# Patient Record
Sex: MALE | Race: WHITE | Employment: FULL TIME | URBAN - NONMETROPOLITAN AREA
[De-identification: names, ages, dates, MRNs, and addresses within clinical notes are randomized per-mention and may not be internally consistent; named-entity substitution may affect disease eponyms.]

---

## 2021-09-03 ENCOUNTER — HOSPITAL ENCOUNTER (OUTPATIENT)
Dept: ULTRASOUND IMAGING | Age: 41
Discharge: HOME | End: 2021-09-03
Payer: OTHER GOVERNMENT

## 2021-09-03 DIAGNOSIS — R79.89 ABNORMAL LFTS (LIVER FUNCTION TESTS): ICD-10-CM

## 2021-09-03 PROCEDURE — 76705 ECHO EXAM OF ABDOMEN: CPT

## 2021-09-22 ENCOUNTER — HOSPITAL ENCOUNTER (OUTPATIENT)
Age: 41
Discharge: HOME | End: 2021-09-22
Payer: OTHER GOVERNMENT

## 2021-09-22 ENCOUNTER — HOSPITAL ENCOUNTER (OUTPATIENT)
Dept: GENERAL RADIOLOGY | Age: 41
Discharge: HOME | End: 2021-09-22
Payer: OTHER GOVERNMENT

## 2021-09-22 DIAGNOSIS — R91.8 INFILTRATE OF LOWER LOBE OF RIGHT LUNG PRESENT ON IMAGING STUDY: ICD-10-CM

## 2021-09-22 DIAGNOSIS — R93.2 ABNORMAL ULTRASOUND OF LIVER: ICD-10-CM

## 2021-09-22 PROCEDURE — 71046 X-RAY EXAM CHEST 2 VIEWS: CPT

## 2021-10-08 ENCOUNTER — HOSPITAL ENCOUNTER (OUTPATIENT)
Age: 41
Discharge: HOME | End: 2021-10-08
Payer: OTHER GOVERNMENT

## 2021-10-08 ENCOUNTER — HOSPITAL ENCOUNTER (OUTPATIENT)
Dept: GENERAL RADIOLOGY | Age: 41
Discharge: HOME | End: 2021-10-08
Payer: OTHER GOVERNMENT

## 2021-10-08 DIAGNOSIS — R91.8 RIGHT PULMONARY INFILTRATE ON CXR: ICD-10-CM

## 2021-10-08 DIAGNOSIS — J90 PLEURAL EFFUSION ON RIGHT: ICD-10-CM

## 2021-10-08 DIAGNOSIS — R91.8 LEFT PULMONARY INFILTRATE ON CXR: ICD-10-CM

## 2021-10-08 PROCEDURE — 71046 X-RAY EXAM CHEST 2 VIEWS: CPT

## 2021-12-26 ENCOUNTER — HOSPITAL ENCOUNTER (EMERGENCY)
Age: 41
Discharge: HOME | End: 2021-12-26
Payer: OTHER GOVERNMENT

## 2021-12-26 VITALS
TEMPERATURE: 97.8 F | RESPIRATION RATE: 16 BRPM | WEIGHT: 277 LBS | OXYGEN SATURATION: 95 % | DIASTOLIC BLOOD PRESSURE: 76 MMHG | HEART RATE: 87 BPM | SYSTOLIC BLOOD PRESSURE: 120 MMHG

## 2021-12-26 DIAGNOSIS — Z20.822 EXPOSURE TO COVID-19 VIRUS: Primary | ICD-10-CM

## 2021-12-26 LAB — SARS-COV-2, NAA: NOT  DETECTED

## 2021-12-26 PROCEDURE — 87635 SARS-COV-2 COVID-19 AMP PRB: CPT

## 2021-12-26 PROCEDURE — 99202 OFFICE O/P NEW SF 15 MIN: CPT | Performed by: NURSE PRACTITIONER

## 2021-12-26 PROCEDURE — 99213 OFFICE O/P EST LOW 20 MIN: CPT

## 2021-12-26 RX ORDER — LEVOTHYROXINE SODIUM 0.12 MG/1
100 TABLET ORAL DAILY
COMMUNITY

## 2021-12-26 RX ORDER — ESCITALOPRAM OXALATE 20 MG/1
20 TABLET ORAL DAILY
COMMUNITY

## 2021-12-26 RX ORDER — NORTRIPTYLINE HYDROCHLORIDE 10 MG/1
10 CAPSULE ORAL NIGHTLY
COMMUNITY

## 2021-12-26 ASSESSMENT — PAIN SCALES - GENERAL: PAINLEVEL_OUTOF10: 4

## 2021-12-26 ASSESSMENT — ENCOUNTER SYMPTOMS
ABDOMINAL PAIN: 0
SHORTNESS OF BREATH: 0
COUGH: 1
DIARRHEA: 0
ABDOMINAL DISTENTION: 0
EYE PAIN: 0
WHEEZING: 0
CHEST TIGHTNESS: 0
CONSTIPATION: 0
EYE REDNESS: 0

## 2021-12-26 ASSESSMENT — PAIN DESCRIPTION - LOCATION: LOCATION: HEAD

## 2021-12-26 NOTE — ED NOTES
advised to call back for any additional questions or concerns     Pt discharged in stable condition, ambulated to vehicle home by himself.          Yogesh Beck, EMILIA  23/50/09 3417

## 2021-12-26 NOTE — ED PROVIDER NOTES
[facility] Encounter      [facility]       Chief Complaint   Patient presents with    Fever    Headache     daughter is covid positive    Concern For COVID-19       Nurses Notes reviewed and I agree except as noted in the HPI. HISTORY OF PRESENT ILLNESS   Roberto Solis is a 46 y.o. male who presents The history is provided by the patient. URI  Presenting symptoms: congestion, cough, fever, rhinorrhea and sore throat    Presenting symptoms: no ear pain and no fatigue    Congestion:     Location:  Nasal    Interferes with sleep: yes      Interferes with eating/drinking: yes    Cough:     Cough characteristics:  Croupy and hacking    Sputum characteristics:  Nondescript    Severity:  Mild    Onset quality:  Sudden    Duration:  2 days    Timing:  Intermittent    Progression:  Worsening    Chronicity:  New  Severity:  Mild  Onset quality:  Sudden  Duration:  2 days  Timing:  Intermittent  Progression:  Waxing and waning  Chronicity:  New  Relieved by:  OTC medications and rest  Worsened by:  Drinking, eating and movement  Ineffective treatments:  OTC medications and rest  Associated symptoms: headaches and sinus pain    Associated symptoms: no arthralgias, no myalgias and no wheezing    Risk factors: sick contacts        REVIEW OF SYSTEMS     Review of Systems   Constitutional: Positive for appetite change and fever. Negative for activity change, fatigue and unexpected weight change. HENT: Positive for congestion, rhinorrhea, sinus pressure, sinus pain and sore throat. Negative for ear discharge, ear pain, nosebleeds and tinnitus. Eyes: Negative for pain and redness. Respiratory: Positive for cough. Negative for chest tightness, shortness of breath and wheezing. Cardiovascular: Negative for chest pain. Gastrointestinal: Negative for abdominal distention, abdominal pain, constipation and diarrhea.    Endocrine: Negative for cold intolerance and heat intolerance. Genitourinary: Negative for difficulty urinating and dysuria. Musculoskeletal: Negative for arthralgias and myalgias. Skin: Negative for pallor and rash. Allergic/Immunologic: Negative for environmental allergies, food allergies and immunocompromised state. Neurological: Positive for headaches. Negative for dizziness, weakness, light-headedness and numbness. Hematological: Negative for adenopathy. Does not bruise/bleed easily. Psychiatric/Behavioral: Negative for behavioral problems and decreased concentration. The patient is not nervous/anxious. All other systems reviewed and are negative. PAST MEDICAL HISTORY   No past medical history on file. SURGICAL HISTORY     Patient  has no past surgical history on file. CURRENT MEDICATIONS       Discharge Medication List as of 12/26/2021  3:59 PM      CONTINUE these medications which have NOT CHANGED    Details   QUETIAPINE FUMARATE PO Take 25 mg by mouth Historical Med      nortriptyline (PAMELOR) 10 MG capsule Take 10 mg by mouth nightlyHistorical Med      escitalopram (LEXAPRO) 20 MG tablet Take 20 mg by mouth dailyHistorical Med      levothyroxine (SYNTHROID) 125 MCG tablet Take 100 mcg by mouth Daily Historical Med             ALLERGIES     Patient is has No Known Allergies. FAMILY HISTORY     Patient'sfamily history is not on file. SOCIAL HISTORY     Patient      PHYSICAL EXAM     ED TRIAGE VITALS  BP: 120/76, Temp: 97.8 °F (36.6 °C), Pulse: 87, Resp: 16, SpO2: 95 %  Physical Exam  Vitals and nursing note reviewed. Constitutional:       Appearance: Normal appearance. He is well-developed and normal weight. HENT:      Head: Normocephalic and atraumatic. Right Ear: External ear normal.      Left Ear: External ear normal.      Nose: Congestion and rhinorrhea ( yellow) present. Mouth/Throat:      Mouth: Mucous membranes are dry. Pharynx: Posterior oropharyngeal erythema present. No oropharyngeal exudate. Eyes:      General:         Right eye: No discharge. Left eye: No discharge. Conjunctiva/sclera: Conjunctivae normal.   Neck:      Thyroid: No thyromegaly. Cardiovascular:      Rate and Rhythm: Normal rate and regular rhythm. Pulses: Normal pulses. Heart sounds: Normal heart sounds. Pulmonary:      Effort: Pulmonary effort is normal. No respiratory distress. Breath sounds: Normal breath sounds. No wheezing or rales. Chest:      Chest wall: No tenderness. Abdominal:      General: Bowel sounds are normal. There is no distension. Palpations: Abdomen is soft. Tenderness: There is no abdominal tenderness. Musculoskeletal:         General: No tenderness. Normal range of motion. Cervical back: Normal range of motion and neck supple. No muscular tenderness. Lymphadenopathy:      Cervical: No cervical adenopathy. Skin:     General: Skin is warm and dry. Capillary Refill: Capillary refill takes less than 2 seconds. Coloration: Skin is pale. Findings: No erythema or rash. Neurological:      General: No focal deficit present. Mental Status: He is alert and oriented to person, place, and time. Mental status is at baseline. Cranial Nerves: No cranial nerve deficit. Motor: No abnormal muscle tone. Coordination: Coordination normal.      Deep Tendon Reflexes: Reflexes are normal and symmetric. Reflexes normal.   Psychiatric:         Behavior: Behavior normal.         Thought Content:  Thought content normal.         Judgment: Judgment normal.         DIAGNOSTIC RESULTS   Labs:   Results for orders placed or performed during the hospital encounter of 12/26/21   COVID-19, Rapid   Result Value Ref Range    SARS-CoV-2, HOLLY NOT  DETECTED NOT DETECTED       IMAGING:  No orders to display     URGENT CARE COURSE:     Vitals:    12/26/21 1524   BP: 120/76   Pulse: 87   Resp: 16   Temp: 97.8 °F (36.6 °C)   SpO2: 95%   Weight: 277 lb (125.6 kg) Medications - No data to display  PROCEDURES:  None  FINALIMPRESSION    I have reviewed the patient's medical history in detail and updated the computerized patient record. HPI/ROS per the patient and caregiver. Overall non toxic in appearance. Answers questions appropriately. Conditions discussed and addressed this visit include:     Patient appears ill but non-toxic and well hydrated. Covid testing completed per his request. VSS. No acute clinical concern for covid at this time. Discussed all findings with the patient. Patient is to take medications as directed. Continue all home medications. Potential side effects of the medications were reviewed with the patient in detail. The patient can increase fluids. The patient can have Tylenol or Motrin for pain and fever as needed. Discussed with patient signs and symptoms that would require emergent evaluation and treatment. The patient will follow-up with their family physician or go to the ER if they develop any worsening symptoms. The patient was discharged in stable condition      1.  Exposure to COVID-19 virus        DISPOSITION/PLAN   DISPOSITION      PATIENT REFERRED TO:  MD Luis Lynn [phone]. [phone]    In 3 days  As needed, If symptoms worsen    DISCHARGE MEDICATIONS:  Discharge Medication List as of 12/26/2021  3:59 PM        Discharge Medication List as of 12/26/2021  3:59 PM          ISABEL Ortega - [address], APRN - CNP  12/29/21 5582

## 2021-12-29 ASSESSMENT — ENCOUNTER SYMPTOMS
SINUS PRESSURE: 1
SINUS PAIN: 1
SORE THROAT: 1
RHINORRHEA: 1

## 2022-01-07 ENCOUNTER — OFFICE VISIT (OUTPATIENT)
Dept: PULMONOLOGY | Age: 42
End: 2022-01-07
Payer: OTHER GOVERNMENT

## 2022-01-07 VITALS
WEIGHT: 284.8 LBS | SYSTOLIC BLOOD PRESSURE: 118 MMHG | DIASTOLIC BLOOD PRESSURE: 78 MMHG | HEART RATE: 66 BPM | OXYGEN SATURATION: 99 % | BODY MASS INDEX: 43.16 KG/M2 | HEIGHT: 68 IN | TEMPERATURE: 97.1 F

## 2022-01-07 DIAGNOSIS — Z78.9 NEVER SMOKED CIGARETTES: ICD-10-CM

## 2022-01-07 DIAGNOSIS — Z99.89 OSA ON CPAP: ICD-10-CM

## 2022-01-07 DIAGNOSIS — J90 PLEURAL EFFUSION ON RIGHT: Primary | ICD-10-CM

## 2022-01-07 DIAGNOSIS — G47.33 OSA ON CPAP: ICD-10-CM

## 2022-01-07 PROCEDURE — 99204 OFFICE O/P NEW MOD 45 MIN: CPT | Performed by: INTERNAL MEDICINE

## 2022-01-07 ASSESSMENT — ENCOUNTER SYMPTOMS
STRIDOR: 0
COUGH: 0
APNEA: 0
CHEST TIGHTNESS: 0
SHORTNESS OF BREATH: 0
WHEEZING: 0
ALLERGIC/IMMUNOLOGIC NEGATIVE: 1
CHOKING: 0
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
## 2022-01-11 ENCOUNTER — HOSPITAL ENCOUNTER (OUTPATIENT)
Dept: ULTRASOUND IMAGING | Age: 42
Discharge: HOME | End: 2022-01-11
Payer: OTHER GOVERNMENT

## 2022-01-11 DIAGNOSIS — J90 PLEURAL EFFUSION ON RIGHT: ICD-10-CM

## 2022-01-11 PROCEDURE — 76604 US EXAM CHEST: CPT

## 2022-01-13 ENCOUNTER — TELEPHONE (OUTPATIENT)
Dept: PULMONOLOGY | Age: 42
End: 2022-01-13

## 2022-01-13 NOTE — TELEPHONE ENCOUNTER
----- Message from Gerardo Pickard MD sent at 1/12/2022 11:03 PM EST -----  No pleural effusion, no need for thoracentesis  Please notify patient

## 2022-01-25 ENCOUNTER — HOSPITAL ENCOUNTER (OUTPATIENT)
Dept: NON INVASIVE DIAGNOSTICS | Age: 42
Discharge: HOME | End: 2022-01-25
Payer: OTHER GOVERNMENT

## 2022-01-25 DIAGNOSIS — J90 PLEURAL EFFUSION, BILATERAL: ICD-10-CM

## 2022-01-25 LAB
LV EF: 60 %
LVEF MODALITY: NORMAL

## 2022-01-25 PROCEDURE — 93306 TTE W/DOPPLER COMPLETE: CPT

## 2022-02-08 ENCOUNTER — HOSPITAL ENCOUNTER (OUTPATIENT)
Dept: CT IMAGING | Age: 42
Discharge: HOME | End: 2022-02-08
Payer: OTHER GOVERNMENT

## 2022-02-08 DIAGNOSIS — J90 PLEURAL EFFUSION ON RIGHT: ICD-10-CM

## 2022-02-08 PROCEDURE — 71250 CT THORAX DX C-: CPT

## 2022-06-27 ENCOUNTER — APPOINTMENT (OUTPATIENT)
Dept: GENERAL RADIOLOGY | Age: 42
End: 2022-06-27
Payer: OTHER GOVERNMENT

## 2022-06-27 ENCOUNTER — HOSPITAL ENCOUNTER (EMERGENCY)
Age: 42
Discharge: HOME | End: 2022-06-27
Payer: OTHER GOVERNMENT

## 2022-06-27 VITALS
SYSTOLIC BLOOD PRESSURE: 122 MMHG | WEIGHT: 280 LBS | OXYGEN SATURATION: 95 % | TEMPERATURE: 98.2 F | DIASTOLIC BLOOD PRESSURE: 78 MMHG | HEART RATE: 59 BPM | BODY MASS INDEX: 42.57 KG/M2 | RESPIRATION RATE: 13 BRPM

## 2022-06-27 DIAGNOSIS — R07.89 PRESSURE IN LEFT SIDE OF CHEST: Primary | ICD-10-CM

## 2022-06-27 LAB
ANION GAP SERPL CALCULATED.3IONS-SCNC: 13 MEQ/L (ref 8–16)
BASOPHILS # BLD: 1.4 %
BASOPHILS ABSOLUTE: 0.1 THOU/MM3 (ref 0–0.1)
BUN BLDV-MCNC: 19 MG/DL (ref 7–22)
CALCIUM SERPL-MCNC: 9.6 MG/DL (ref 8.5–10.5)
CHLORIDE BLD-SCNC: 100 MEQ/L (ref 98–111)
CO2: 26 MEQ/L (ref 23–33)
CREAT SERPL-MCNC: 1 MG/DL (ref 0.4–1.2)
EKG ATRIAL RATE: 59 BPM
EKG P AXIS: 31 DEGREES
EKG P-R INTERVAL: 154 MS
EKG Q-T INTERVAL: 418 MS
EKG QRS DURATION: 122 MS
EKG QTC CALCULATION (BAZETT): 413 MS
EKG R AXIS: -7 DEGREES
EKG T AXIS: 30 DEGREES
EKG VENTRICULAR RATE: 59 BPM
EOSINOPHIL # BLD: 4.5 %
EOSINOPHILS ABSOLUTE: 0.3 THOU/MM3 (ref 0–0.4)
ERYTHROCYTE [DISTWIDTH] IN BLOOD BY AUTOMATED COUNT: 14.6 % (ref 11.5–14.5)
ERYTHROCYTE [DISTWIDTH] IN BLOOD BY AUTOMATED COUNT: 45.1 FL (ref 35–45)
GFR SERPL CREATININE-BSD FRML MDRD: 83 ML/MIN/1.73M2
GLUCOSE BLD-MCNC: 87 MG/DL (ref 70–108)
HCT VFR BLD CALC: 44.3 % (ref 42–52)
HEMOGLOBIN: 14.2 GM/DL (ref 14–18)
IMMATURE GRANS (ABS): 0.01 THOU/MM3 (ref 0–0.07)
IMMATURE GRANULOCYTES: 0.2 %
LYMPHOCYTES # BLD: 31.7 %
LYMPHOCYTES ABSOLUTE: 2 THOU/MM3 (ref 1–4.8)
MCH RBC QN AUTO: 27.4 PG (ref 26–33)
MCHC RBC AUTO-ENTMCNC: 32.1 GM/DL (ref 32.2–35.5)
MCV RBC AUTO: 85.5 FL (ref 80–94)
MONOCYTES # BLD: 6.3 %
MONOCYTES ABSOLUTE: 0.4 THOU/MM3 (ref 0.4–1.3)
NUCLEATED RED BLOOD CELLS: 0 /100 WBC
OSMOLALITY CALCULATION: 279.2 MOSMOL/KG (ref 275–300)
PLATELET # BLD: 323 THOU/MM3 (ref 130–400)
PMV BLD AUTO: 8.8 FL (ref 9.4–12.4)
POTASSIUM SERPL-SCNC: 4.4 MEQ/L (ref 3.5–5.2)
RBC # BLD: 5.18 MILL/MM3 (ref 4.7–6.1)
SEG NEUTROPHILS: 55.9 %
SEGMENTED NEUTROPHILS ABSOLUTE COUNT: 3.6 THOU/MM3 (ref 1.8–7.7)
SODIUM BLD-SCNC: 139 MEQ/L (ref 135–145)
TROPONIN T: < 0.01 NG/ML
TROPONIN T: < 0.01 NG/ML
WBC # BLD: 6.4 THOU/MM3 (ref 4.8–10.8)

## 2022-06-27 PROCEDURE — 99215 OFFICE O/P EST HI 40 MIN: CPT

## 2022-06-27 PROCEDURE — 84484 ASSAY OF TROPONIN QUANT: CPT

## 2022-06-27 PROCEDURE — 71046 X-RAY EXAM CHEST 2 VIEWS: CPT

## 2022-06-27 PROCEDURE — 85025 COMPLETE CBC W/AUTO DIFF WBC: CPT

## 2022-06-27 PROCEDURE — 99284 EMERGENCY DEPT VISIT MOD MDM: CPT

## 2022-06-27 PROCEDURE — 36415 COLL VENOUS BLD VENIPUNCTURE: CPT

## 2022-06-27 PROCEDURE — 6370000000 HC RX 637 (ALT 250 FOR IP): Performed by: NURSE PRACTITIONER

## 2022-06-27 PROCEDURE — 93005 ELECTROCARDIOGRAM TRACING: CPT | Performed by: NURSE PRACTITIONER

## 2022-06-27 PROCEDURE — 93010 ELECTROCARDIOGRAM REPORT: CPT | Performed by: INTERNAL MEDICINE

## 2022-06-27 PROCEDURE — 80048 BASIC METABOLIC PNL TOTAL CA: CPT

## 2022-06-27 RX ORDER — OMEPRAZOLE 10 MG/1
10 CAPSULE, DELAYED RELEASE ORAL DAILY
COMMUNITY

## 2022-06-27 RX ORDER — ASPIRIN 81 MG/1
324 TABLET, CHEWABLE ORAL ONCE
Status: COMPLETED | OUTPATIENT
Start: 2022-06-27 | End: 2022-06-27

## 2022-06-27 RX ORDER — ASPIRIN 81 MG/1
TABLET, CHEWABLE ORAL
Status: DISCONTINUED
Start: 2022-06-27 | End: 2022-06-27

## 2022-06-27 RX ADMIN — ASPIRIN 324 MG: 81 TABLET, CHEWABLE ORAL at 18:36

## 2022-06-27 ASSESSMENT — ENCOUNTER SYMPTOMS
WHEEZING: 0
SINUS PRESSURE: 0
EYE PAIN: 0
COLOR CHANGE: 0
BLOOD IN STOOL: 0
DIARRHEA: 0
SHORTNESS OF BREATH: 1
BACK PAIN: 0
VOMITING: 0
ABDOMINAL PAIN: 0
COUGH: 0
RHINORRHEA: 0
NAUSEA: 0
HEARTBURN: 0
CONSTIPATION: 0
CHEST TIGHTNESS: 1

## 2022-06-27 ASSESSMENT — PAIN - FUNCTIONAL ASSESSMENT: PAIN_FUNCTIONAL_ASSESSMENT: 0-10

## 2022-06-27 ASSESSMENT — PAIN SCALES - GENERAL: PAINLEVEL_OUTOF10: 6

## 2022-06-27 ASSESSMENT — HEART SCORE: ECG: 0

## 2022-06-27 NOTE — Clinical Note
Balwinder Guzman was seen and treated in our emergency department on 6/27/2022. He may return to work on 06/29/2022. If you have any questions or concerns, please don't hesitate to call.       Katy Wong, APRN - CNP

## 2022-06-27 NOTE — ED PROVIDER NOTES
[address] [facility] [address] [address] EMERGENCY DEPT  Urgent Care Encounter       CHIEF COMPLAINT       Chief Complaint   Patient presents with    Chest Pain       Nurses Notes reviewed and I agree except as noted in the HPI. HISTORY OF PRESENT ILLNESS   Roberto Casillas is a 38 y.o. male who presents to the urgent care center complaining of chest pressure. Patient states that he was at work at rest when he developed pressure to the left chest which has been persistent for over the past hour to hour and a half. Patient rated his discomfort initially 6 on a 10 scale. Patient states that it seems to have almost resolved at this point. He denied any nausea any diaphoresis, vomiting. Patient denies any history of heart problems. Patient at the present time is laying on the table skin is warm and dry patient does not appear to be in any acute distress at the present time. The history is provided by the patient. No  was used.    Chest Pain  Pain location:  L chest  Pain quality: pressure    Pain radiates to:  Does not radiate  Pain severity:  Mild  Onset quality:  Sudden  Duration:  90 minutes  Timing:  Constant  Progression:  Partially resolved  Chronicity:  New  Context: at rest    Context: not breathing, not lifting, not movement and not raising an arm    Relieved by:  None tried  Worsened by:  Nothing  Ineffective treatments:  None tried  Associated symptoms: shortness of breath    Associated symptoms: no abdominal pain, no altered mental status, no anxiety, no back pain, no diaphoresis, no dizziness, no fatigue, no fever, no headache, no heartburn, no lower extremity edema, no nausea, no near-syncope, no numbness, no palpitations, no PND and no vomiting    Shortness of breath:     Severity:  Mild    Onset quality:  Sudden    Duration:  1 hour    Timing:  Rare    Progression:  Resolved  Risk factors: male sex and obesity    Risk factors: no coronary artery disease, no diabetes mellitus, no hypertension, no prior DVT/PE and no smoking        REVIEW OF SYSTEMS     Review of Systems   Constitutional: Negative for appetite change, diaphoresis, fatigue and fever. Respiratory: Positive for chest tightness and shortness of breath. Negative for wheezing. Cardiovascular: Positive for chest pain. Negative for palpitations, leg swelling, PND and near-syncope. Gastrointestinal: Negative for abdominal pain, diarrhea, heartburn, nausea and vomiting. Musculoskeletal: Negative for back pain, neck pain and neck stiffness. Skin: Negative for color change and pallor. Neurological: Negative for dizziness, light-headedness, numbness and headaches. Hematological: Negative for adenopathy. PAST MEDICAL HISTORY         Diagnosis Date    Anxiety     Depression     GERD (gastroesophageal reflux disease)     PTSD (post-traumatic stress disorder)        SURGICALHISTORY     Patient  has a past surgical history that includes Gallbladder surgery. CURRENT MEDICATIONS       Current Discharge Medication List      CONTINUE these medications which have NOT CHANGED    Details   omeprazole (PRILOSEC) 10 MG delayed release capsule Take 10 mg by mouth daily      QUETIAPINE FUMARATE PO Take 25 mg by mouth       nortriptyline (PAMELOR) 10 MG capsule Take 10 mg by mouth nightly      escitalopram (LEXAPRO) 20 MG tablet Take 20 mg by mouth daily      levothyroxine (SYNTHROID) 125 MCG tablet Take 100 mcg by mouth Daily              ALLERGIES     Patient is has No Known Allergies. Patients   There is no immunization history on file for this patient. FAMILY HISTORY     Patient's family history is not on file. SOCIAL HISTORY     Patient  reports that he has never smoked. He has never used smokeless tobacco.    PHYSICAL EXAM     ED TRIAGE VITALS  BP: 118/75,  , Heart Rate: 58, Resp: 20, SpO2: 100 %,Estimated body mass index is 43.3 kg/m² as calculated from the following:    Height as of 1/7/22: 5' 8\" (1.727 m).     Weight as of 1/7/22: 284 lb 12.8 oz (129.2 kg). ,No LMP for male patient. Physical Exam  Vitals and nursing note reviewed. Constitutional:       General: He is not in acute distress. Appearance: Normal appearance. He is well-developed. He is not ill-appearing, toxic-appearing or diaphoretic. HENT:      Head: Normocephalic and atraumatic. Right Ear: External ear normal.      Left Ear: External ear normal.      Nose: Nose normal. No congestion or rhinorrhea. Eyes:      Pupils: Pupils are equal, round, and reactive to light. Cardiovascular:      Rate and Rhythm: Normal rate. Heart sounds: Normal heart sounds and S1 normal.   Pulmonary:      Effort: Pulmonary effort is normal.   Chest:       Musculoskeletal:      Cervical back: Normal range of motion. Skin:     General: Skin is warm and dry. Capillary Refill: Capillary refill takes less than 2 seconds. Neurological:      Mental Status: He is alert and oriented to person, place, and time. Sensory: No sensory deficit. Psychiatric:         Mood and Affect: Mood normal.         Behavior: Behavior normal. Behavior is cooperative. DIAGNOSTIC RESULTS     Labs:  Results for orders placed or performed during the hospital encounter of 06/27/22   EKG 12 Lead   Result Value Ref Range    Ventricular Rate 59 BPM    Atrial Rate 59 BPM    P-R Interval 154 ms    QRS Duration 122 ms    Q-T Interval 418 ms    QTc Calculation (Bazett) 413 ms    P Axis 31 degrees    R Axis -7 degrees    T Axis 30 degrees       IMAGING:    No orders to display         EKG:      URGENT CARE COURSE:     Vitals:    06/27/22 1826 06/27/22 1831   BP:  118/75   Pulse: 58    Resp: 20    SpO2: 100%        Medications   aspirin chewable tablet 324 mg (324 mg Oral Given 6/27/22 1836)            PROCEDURES:  None    FINAL IMPRESSION      1.  Pressure in left side of chest          DISPOSITION/ PLAN         After reviewing the patients History of Present illness, Vital Signs,Clinical Findings,Comorbities, and Clinical Data obtained I discussed with the patient or patient representative that there is a very real potential for serious injury / illness and the patient will need to be transfer to a level of higher care for further evaluation and continued care. It was explained that this would provide the best care for the patient. The patient/patient representative are agreeable to the treatment plan and are agreeable to be transferred therefore, the patient will be transferred to [facility] ED  for reevaluation and further management . Report was called to the accepting facility and given to [facility] RN who accepted the patients transfer. Patient was transferred from the Urgent Care in stable condition by POV .   Patient refused EMS transfer  PATIENT REFERRED TO:  Irina Calixto MD  [id number] [facility] / [place] [id number]      DISCHARGE MEDICATIONS:  Current Discharge Medication List          Current Discharge Medication List          Current Discharge Medication List          ISABEL Hurtado CNP    (Please note that portions of this note were completed with a voice recognition program. Efforts were made to edit the dictations but occasionally words are mis-transcribed.)           ISABEL Hurtado CNP  06/27/22 9768

## 2022-06-27 NOTE — ED PROVIDER NOTES
[address] [facility] [address] [address] EMERGENCY DEPT  EMERGENCY MEDICINE     Pt Name: Sheryle Necessary  MRN: [id number]  Brantgfmeenakshi 1980  Date of evaluation: 6/27/2022  PCP:    Linsey Fine MD  Provider: ISABEL hCery CNP    CHIEF COMPLAINT       Chief Complaint   Patient presents with    Chest Pain           HISTORY OF PRESENT ILLNESS    Roberto Casillas is a 38 y.o. male patient that presents to ER with complaint of chest pressure with associated shortness of breath that started at around 5 PM today while he was at work. Patient initially presented to urgent care in the sent him to the ER. Patient states his symptoms have \"pretty much resolved\". He denies nausea, vomiting, diaphoresis, dizziness or fever. Patient explains the chest pain as a pressure and it was mid chest area. Triage notes and Nursing notes were reviewed by myself. Any discrepancies are addressed above. PAST MEDICAL HISTORY     Past Medical History:   Diagnosis Date    Anxiety     Depression     GERD (gastroesophageal reflux disease)     PTSD (post-traumatic stress disorder)        SURGICAL HISTORY       Past Surgical History:   Procedure Laterality Date    GALLBLADDER SURGERY         CURRENT MEDICATIONS       Discharge Medication List as of 6/27/2022 10:09 PM      CONTINUE these medications which have NOT CHANGED    Details   omeprazole (PRILOSEC) 10 MG delayed release capsule Take 10 mg by mouth dailyHistorical Med      QUETIAPINE FUMARATE PO Take 25 mg by mouth Historical Med      nortriptyline (PAMELOR) 10 MG capsule Take 10 mg by mouth nightlyHistorical Med      escitalopram (LEXAPRO) 20 MG tablet Take 20 mg by mouth dailyHistorical Med      levothyroxine (SYNTHROID) 125 MCG tablet Take 100 mcg by mouth Daily Historical Med             ALLERGIES       No Known Allergies    FAMILY HISTORY       History reviewed. No pertinent family history.      SOCIAL HISTORY       Social History     Socioeconomic History    Marital status:      Spouse name: None    Number of children: None    Years of education: None    Highest education level: None   Occupational History    None   Tobacco Use    Smoking status: Never Smoker    Smokeless tobacco: Never Used   Substance and Sexual Activity    Alcohol use: None    Drug use: None    Sexual activity: None   Other Topics Concern    None   Social History Narrative    None     Social Determinants of Health     Financial Resource Strain:     Difficulty of Paying Living Expenses: Not on file   Food Insecurity:     Worried About Running Out of Food in the Last Year: Not on file    Avelina of Food in the Last Year: Not on file   Transportation Needs:     Lack of Transportation (Medical): Not on file    Lack of Transportation (Non-Medical): Not on file   Physical Activity:     Days of Exercise per Week: Not on file    Minutes of Exercise per Session: Not on file   Stress:     Feeling of Stress : Not on file   Social Connections:     Frequency of Communication with Friends and Family: Not on file    Frequency of Social Gatherings with Friends and Family: Not on file    Attends Hoahaoism Services: Not on file    Active Member of [address] [place] [address] or Organizations: Not on file    Attends Club or Organization Meetings: Not on file    Marital Status: Not on file   Intimate Partner Violence:     Fear of Current or Ex-Partner: Not on file    Emotionally Abused: Not on file    Physically Abused: Not on file    Sexually Abused: Not on file   Housing Stability:     Unable to Pay for Housing in the Last Year: Not on file    Number of [place] in the Last Year: Not on file    Unstable Housing in the Last Year: Not on file       REVIEW OF SYSTEMS     Review of Systems   Constitutional: Negative for appetite change, chills, fatigue, fever and unexpected weight change. HENT: Negative for ear pain, rhinorrhea and sinus pressure. Eyes: Negative for pain and visual disturbance. Respiratory: Positive for shortness of breath. Negative for cough and wheezing. Cardiovascular: Positive for chest pain. Negative for palpitations and leg swelling. Gastrointestinal: Negative for abdominal pain, blood in stool, constipation, diarrhea, nausea and vomiting. Genitourinary: Negative for dysuria, frequency and hematuria. Musculoskeletal: Negative for arthralgias and joint swelling. Skin: Negative for rash. Neurological: Negative for dizziness, syncope, weakness, light-headedness and headaches. Hematological: Does not bruise/bleed easily. Except as noted above the remainder of the review of systems was reviewed and is negative. SCREENINGS        Clemons Coma Scale  Eye Opening: Spontaneous  Best Verbal Response: Oriented  Best Motor Response: Obeys commands  Tomasa Coma Scale Score: 15 Heart Score for chest pain patients  History: Slightly Suspicious  ECG: Normal  Patient Age: < 45 years  *Risk factors for Atherosclerotic disease: Obesity  Risk Factors: 1 or 2 risk factors  Troponin: < 1X normal limit  Heart Score Total: 1             PHYSICAL EXAM    (up to 7 for level 4, 8 or more for level 5)     ED Triage Vitals [02/19/22 1512]   BP Temp Temp Source Pulse Resp SpO2 Height Weight   (!) 134/95 98.2 °F (36.8 °C) Oral 124 16 100 % -- --       Physical Exam  Vitals and nursing note reviewed. Constitutional:       General: He is not in acute distress. Appearance: He is well-developed. He is not diaphoretic. HENT:      Head: Normocephalic and atraumatic. Eyes:      Conjunctiva/sclera: Conjunctivae normal.      Pupils: Pupils are equal, round, and reactive to light. Cardiovascular:      Rate and Rhythm: Normal rate and regular rhythm. Heart sounds: Normal heart sounds. No murmur heard. No gallop. Pulmonary:      Effort: Pulmonary effort is normal. No respiratory distress. Breath sounds: Normal breath sounds. No wheezing or rales.    Abdominal:      General: Bowel sounds are normal.      Palpations: Abdomen is soft. Musculoskeletal:         General: Normal range of motion. Cervical back: Normal range of motion. Skin:     General: Skin is warm and dry. Neurological:      Mental Status: He is alert and oriented to person, place, and time. DIAGNOSTIC RESULTS     EKG:(none if blank)  All EKGs are interpreted by the Emergency Department Physician who either signs or Co-signs this chart in the absence of a cardiologist.    Sinus bradycardia with a ventricular rate of 59. WY interval 154 ms,  ms,  ms and  ms. RADIOLOGY: (none if blank)   I directly visualized the following images and reviewed the radiologist interpretations. Interpretation per the Radiologist below, if available at the time of this note:  XR CHEST (2 VW)   Final Result   Small layering right pleural effusion. This document has been electronically signed by: Rio Gaspar MD on    06/27/2022 08:06 PM          LABS:  Labs Reviewed   CBC WITH AUTO DIFFERENTIAL - Abnormal; Notable for the following components:       Result Value    MCHC 32.1 (*)     RDW-CV 14.6 (*)     RDW-SD 45.1 (*)     MPV 8.8 (*)     All other components within normal limits   GLOMERULAR FILTRATION RATE, ESTIMATED - Abnormal; Notable for the following components:    Est, Glom Filt Rate 83 (*)     All other components within normal limits   BASIC METABOLIC PANEL   TROPONIN   ANION GAP   OSMOLALITY   TROPONIN       All other labs were within normal range or not returned as of this dictation. Please note, any cultures that may have been sent were not resulted at the time of this patient visit.     EMERGENCY DEPARTMENT COURSE and Medical Decision Making:     Vitals:    Vitals:    06/27/22 1826 06/27/22 1831 06/27/22 1902 06/27/22 1938   BP:  118/75 130/80 122/78   Pulse: 58  76 59   Resp: 20  18 13   Temp:   98.2 °F (36.8 °C)    TempSrc:   Oral    SpO2: 100%  96% 95%   Weight:   280 lb (127 kg) PROCEDURES: (None if blank)  Procedures    ED Course as of 06/27/22 2234 Mon Jun 27, 2022 2209 Reviewed case with Dr Yanique Downing. OK for discharge. [NW]      ED Course User Index  [NW] ISABEL Jenkins CNP     MDM  Number of Diagnoses or Management Options     Amount and/or Complexity of Data Reviewed  Clinical lab tests: ordered and reviewed  Tests in the radiology section of CPT®: ordered and reviewed  Tests in the medicine section of CPT®: reviewed      Patient that presents to ER with complaint of chest pressure with associated shortness of breath that started at around 5 PM today while he was at work. Differential diagnosis includes but not limited to electrolyte abnormality, dehydration, pneumonia, NSTEMI or musculoskeletal chest pain. Strict return precautions and follow up instructions were discussed with the patient with which the patient agrees    ED Medications administered this visit:    Medications   aspirin chewable tablet 324 mg (324 mg Oral Given 6/27/22 4216)         FINAL IMPRESSION      1.  Pressure in left side of chest          DISPOSITION/PLAN   DISPOSITION Decision To Discharge 06/27/2022 10:10:18 PM      [facility]:  [phone]    In 2 days        DISCHARGE MEDICATIONS:  Discharge Medication List as of 6/27/2022 10:09 PM                 ISABEL Jenkins CNP (electronically signed)           ISABEL Jenkins CNP  06/27/22 2234

## 2022-06-27 NOTE — ED TRIAGE NOTES
Patient to UC with c/o chest pain. Pt taken back to the room for a stat EKG. Pt stated it started around 5pm today. Pt also reports Shortness of breath .

## 2024-03-30 ENCOUNTER — HOSPITAL ENCOUNTER (EMERGENCY)
Age: 44
Discharge: HOME | End: 2024-03-30
Payer: OTHER GOVERNMENT

## 2024-03-30 VITALS
SYSTOLIC BLOOD PRESSURE: 134 MMHG | OXYGEN SATURATION: 100 % | TEMPERATURE: 98.6 F | RESPIRATION RATE: 20 BRPM | DIASTOLIC BLOOD PRESSURE: 87 MMHG | HEART RATE: 65 BPM

## 2024-03-30 DIAGNOSIS — B37.0 THRUSH, ORAL: Primary | ICD-10-CM

## 2024-03-30 PROCEDURE — 99213 OFFICE O/P EST LOW 20 MIN: CPT

## 2024-03-30 PROCEDURE — 99213 OFFICE O/P EST LOW 20 MIN: CPT | Performed by: NURSE PRACTITIONER

## 2024-03-30 RX ORDER — MELATONIN
COMMUNITY
Start: 2024-01-22

## 2024-03-30 ASSESSMENT — ENCOUNTER SYMPTOMS
SORE THROAT: 0
CHEST TIGHTNESS: 0
WHEEZING: 0
STRIDOR: 0
RHINORRHEA: 0
COUGH: 0
CHOKING: 0
SHORTNESS OF BREATH: 0
SWOLLEN GLANDS: 0
APNEA: 0

## 2024-03-30 ASSESSMENT — PAIN SCALES - GENERAL: PAINLEVEL_OUTOF10: 3

## 2024-03-30 NOTE — ED TRIAGE NOTES
Pt to uc with c/o mouth pain ongoing x 2 weeks. Pt reports it might be from not brushing his teeth for over a week. Pt denies any dentist currently.

## 2024-03-30 NOTE — ED PROVIDER NOTES
[facility] URGENT CARE  Urgent Care Encounter      CHIEF COMPLAINT       Chief Complaint   Patient presents with    Oral Pain       Nurses Notes reviewed and I agree except as noted in the HPI.  HISTORY OFPRESENT ILLNESS   Roberto Wiggins is a 43 y.o.  The history is provided by the patient. No  was used.   Mouth Lesions  Location:  Lower lip and upper gingiva  Lower lip location:  L inner  Upper gingiva location:  L buccal  Quality:  White and multiple  Onset quality:  Unable to specify  Severity:  Moderate  Progression:  Worsening  Chronicity:  New  Context: not a change in diet, not a change in medications, not medications, not a possible infection, not stress and not trauma    Relieved by:  Nothing  Worsened by:  Nothing  Ineffective treatments:  None tried  Associated symptoms: no congestion, no dental pain, no ear pain, no fever, no malaise, no neck pain, no rash, no rhinorrhea, no sore throat and no swollen glands        REVIEW OF SYSTEMS     Review of Systems   Constitutional:  Positive for appetite change. Negative for activity change, chills, diaphoresis, fatigue and fever.   HENT:  Positive for mouth sores. Negative for congestion, ear pain, rhinorrhea and sore throat.    Respiratory:  Negative for apnea, cough, choking, chest tightness, shortness of breath, wheezing and stridor.    Cardiovascular:  Negative for chest pain, palpitations and leg swelling.   Musculoskeletal:  Negative for neck pain.   Skin:  Negative for rash.       PAST MEDICAL HISTORY         Diagnosis Date    Anxiety     Depression     GERD (gastroesophageal reflux disease)     PTSD (post-traumatic stress disorder)        SURGICAL HISTORY     Patient  has a past surgical history that includes Gallbladder surgery.    CURRENT MEDICATIONS       Discharge Medication List as of 3/30/2024  3:07 PM        CONTINUE these medications which have NOT CHANGED    Details   VITAMIN D3 25 MCG (1000 UT) TABS  tablet DAWHistorical Med      omeprazole (PRILOSEC) 10 MG delayed release capsule Take 10 mg by mouth dailyHistorical Med      QUETIAPINE FUMARATE PO Take 25 mg by mouth Historical Med      escitalopram (LEXAPRO) 20 MG tablet Take 20 mg by mouth dailyHistorical Med      levothyroxine (SYNTHROID) 125 MCG tablet Take 100 mcg by mouth Daily Historical Med             ALLERGIES     Patient is has No Known Allergies.    FAMILY HISTORY     Patient's family history is not on file.    SOCIAL HISTORY     Patient  reports that he has never smoked. He has never used smokeless tobacco.    PHYSICAL EXAM     ED TRIAGE VITALS  BP: 134/87, Temp: 98.6 °F (37 °C), Pulse: 65, Respirations: 20, SpO2: 100 %  Physical Exam  Vitals and nursing note reviewed.   Constitutional:       General: He is not in acute distress.     Appearance: Normal appearance. He is obese. He is not ill-appearing, toxic-appearing or diaphoretic.   HENT:      Head: Normocephalic and atraumatic.      Right Ear: External ear normal.      Left Ear: External ear normal.      Mouth/Throat:      Lips: Pink.      Mouth: Mucous membranes are moist. Oral lesions present.      Dentition: Does not have dentures. Gingival swelling present. No dental tenderness, dental caries, dental abscesses or gum lesions.      Tongue: No lesions. Tongue does not deviate from midline.     Eyes:      Extraocular Movements: Extraocular movements intact.      Conjunctiva/sclera: Conjunctivae normal.   Pulmonary:      Effort: Pulmonary effort is normal.   Musculoskeletal:      Cervical back: Normal range of motion.   Skin:     General: Skin is warm.   Neurological:      General: No focal deficit present.      Mental Status: He is alert and oriented to person, place, and time.   Psychiatric:         Mood and Affect: Mood normal.         Behavior: Behavior normal.         Thought Content: Thought content normal.         Judgment: Judgment normal.         DIAGNOSTIC RESULTS   Labs:No results

## 2024-05-14 ENCOUNTER — HOSPITAL ENCOUNTER (EMERGENCY)
Age: 44
Discharge: HOME | End: 2024-05-14
Attending: EMERGENCY MEDICINE

## 2024-05-14 ENCOUNTER — OFFICE VISIT (OUTPATIENT)
Dept: ENT CLINIC | Age: 44
End: 2024-05-14

## 2024-05-14 ENCOUNTER — TELEPHONE (OUTPATIENT)
Dept: ENT CLINIC | Age: 44
End: 2024-05-14

## 2024-05-14 VITALS
HEIGHT: 68 IN | DIASTOLIC BLOOD PRESSURE: 93 MMHG | OXYGEN SATURATION: 96 % | HEART RATE: 66 BPM | SYSTOLIC BLOOD PRESSURE: 124 MMHG | WEIGHT: 295 LBS | RESPIRATION RATE: 18 BRPM | TEMPERATURE: 97.9 F | BODY MASS INDEX: 44.71 KG/M2

## 2024-05-14 VITALS
HEIGHT: 68 IN | BODY MASS INDEX: 45.09 KG/M2 | WEIGHT: 297.5 LBS | SYSTOLIC BLOOD PRESSURE: 128 MMHG | DIASTOLIC BLOOD PRESSURE: 84 MMHG | HEART RATE: 86 BPM | TEMPERATURE: 97.3 F | OXYGEN SATURATION: 96 %

## 2024-05-14 DIAGNOSIS — T16.1XXA FOREIGN BODY OF RIGHT EAR, INITIAL ENCOUNTER: Primary | ICD-10-CM

## 2024-05-14 PROCEDURE — 99203 OFFICE O/P NEW LOW 30 MIN: CPT | Performed by: REGISTERED NURSE

## 2024-05-14 PROCEDURE — 99282 EMERGENCY DEPT VISIT SF MDM: CPT

## 2024-05-14 RX ORDER — OFLOXACIN 3 MG/ML
4 SOLUTION/ DROPS OPHTHALMIC 2 TIMES DAILY
Qty: 5 ML | Refills: 0 | Status: SHIPPED | OUTPATIENT
Start: 2024-05-14 | End: 2024-05-21

## 2024-05-14 ASSESSMENT — PAIN SCALES - GENERAL: PAINLEVEL_OUTOF10: 2

## 2024-05-14 ASSESSMENT — PAIN - FUNCTIONAL ASSESSMENT: PAIN_FUNCTIONAL_ASSESSMENT: 0-10

## 2024-05-14 NOTE — DISCHARGE INSTRUCTIONS
You were seen for a foreign body in your ear.  Unfortunately it is very deep in the ear canal and we do not have the tools to remove it here in the emergency room.  There is a risk of perforating the eardrum if I try to remove it here.  However it should not cause any damage being in there for short period of time.  Please call Dr. King's clinic today and schedule an appointment for foreign body removal of the ear.  They have proper equipment to remove the object safely without hurting the eardrum.

## 2024-05-14 NOTE — ED PROVIDER NOTES
Pulse Respirations SpO2 Height Weight - Scale   (!) 124/93 97.9 °F (36.6 °C) Oral 66 18 96 % 1.727 m (5' 8\") 133.8 kg (295 lb)       Additional Vital Signs:  Vitals:    05/14/24 0519   BP: (!) 124/93   Pulse: 66   Resp: 18   Temp: 97.9 °F (36.6 °C)   SpO2: 96%     Physical Exam  Vitals and nursing note reviewed.   Constitutional:       General: He is not in acute distress.     Appearance: He is normal weight. He is not ill-appearing.   HENT:      Head: Normocephalic and atraumatic.      Right Ear: No decreased hearing noted. No laceration, drainage, swelling or tenderness. A foreign body is present.      Left Ear: Hearing, tympanic membrane, ear canal and external ear normal.      Mouth/Throat:      Mouth: Mucous membranes are moist.      Pharynx: Oropharynx is clear.   Eyes:      Extraocular Movements: Extraocular movements intact.      Pupils: Pupils are equal, round, and reactive to light.   Cardiovascular:      Rate and Rhythm: Normal rate and regular rhythm.      Heart sounds: No murmur heard.  Pulmonary:      Effort: Pulmonary effort is normal. No respiratory distress.      Breath sounds: Normal breath sounds. No decreased breath sounds.   Abdominal:      General: Bowel sounds are normal.      Palpations: Abdomen is soft.      Tenderness: There is no abdominal tenderness. There is no guarding or rebound.   Musculoskeletal:      Cervical back: Neck supple.      Right lower leg: No edema.      Left lower leg: No edema.   Skin:     General: Skin is warm and dry.      Capillary Refill: Capillary refill takes less than 2 seconds.   Neurological:      General: No focal deficit present.      Mental Status: He is alert.         FORMAL DIAGNOSTIC RESULTS     RADIOLOGY: Interpretation per the Radiologist below, if available at the time of this note (none if blank):    No orders to display       LABS: (none if blank)  Labs Reviewed - No data to display    (Any cultures that may have been sent were not resulted at the

## 2024-05-14 NOTE — ED TRIAGE NOTES
Pt arrives to ED from home for c/o foreign body in right ear. Pt states he was cleaning his ear with a plastic juan with a blue tip on it when the tip came off in his ear.

## 2024-05-14 NOTE — TELEPHONE ENCOUNTER
ICC from patient who was seen in ER last night/early morning for foreign body in his ear. He said that the ER could not get the plastic piece out of his ear. He was using a plastic ear cleaning applicator. He has some pain currently to the right ear. Please advise when to schedule the patient and with whom

## 2024-05-14 NOTE — DISCHARGE INSTR - COC
Matthew Wiggins  is necessary for the continuing treatment of the diagnosis listed and that he requires {Admit to Appropriate Level of Care:65335} for {GREATER/LESS:278718124} 30 days.     Update Admission H&P: {CHP DME Changes in HandP:329398541}    PHYSICIAN SIGNATURE:  {Esignature:[id number]}

## 2024-05-14 NOTE — PROGRESS NOTES
[facility] [place] [facility]  [facility] EAR, NOSE AND THROAT  [address]  [address]  [facility] [address]  Dept: [phone]  Dept Fax: [phone]  Loc: [phone]    Roberto Wiggins is a 43 y.o. male who was referred by No ref. provider found for:  Chief Complaint   Patient presents with    Foreign Body in Ear     New patient here for evaluation of foreign body in right ear. Referred by [facility] ER.    .    HPI:     Roberto Wiggins presents today for foreign body in right ear.  Patient was referred by [facility] ER; notes reviewed. Patient states he was cleaning his ear yesterday with an ear cleaning appliance and the tip of the tool fell off and remained trapped in his right ear canal. Patient presented to ER yesterday and attempts with irrigation were made for removal; these were unsuccessful. Patient reports he has been having some otalgia since it became trapped. No otorrhea or fevers/chills noted. No other concerns voiced at this time.     Subjective:      REVIEW OF SYSTEMS:    Pertinent positives as noted in the HPI. All other systems reviewed and negative.    ALLERGIES:  Patient has no known allergies.    Past Medical History:  Past Medical History:   Diagnosis Date    Anxiety     Depression     GERD (gastroesophageal reflux disease)     PTSD (post-traumatic stress disorder)        Family History:   History reviewed. No pertinent family history.    Surgical History:  Past Surgical History:   Procedure Laterality Date    GALLBLADDER SURGERY          MEDICATIONS:  Current Outpatient Medications   Medication Sig Dispense Refill    ofloxacin (OCUFLOX) 0.3 % solution Place 4 drops in ear(s) in the morning and 4 drops in the evening. Do all this for 7 days. right ear, keep instilled ear up 10 minutes, warm to body temp in pocket before use. 5 mL 0    VITAMIN D3 25 MCG (1000 UT) TABS tablet       omeprazole (PRILOSEC) 10 MG delayed release capsule Take 1 capsule by mouth daily

## 2024-08-01 ENCOUNTER — HOSPITAL ENCOUNTER (EMERGENCY)
Age: 44
Discharge: HOME | End: 2024-08-01
Payer: OTHER GOVERNMENT

## 2024-08-01 VITALS
RESPIRATION RATE: 16 BRPM | HEIGHT: 68 IN | BODY MASS INDEX: 45.47 KG/M2 | DIASTOLIC BLOOD PRESSURE: 87 MMHG | HEART RATE: 62 BPM | TEMPERATURE: 98.1 F | OXYGEN SATURATION: 96 % | WEIGHT: 300 LBS | SYSTOLIC BLOOD PRESSURE: 128 MMHG

## 2024-08-01 DIAGNOSIS — K13.79 MOUTH PAIN: Primary | ICD-10-CM

## 2024-08-01 PROCEDURE — 99213 OFFICE O/P EST LOW 20 MIN: CPT

## 2024-08-01 PROCEDURE — 99213 OFFICE O/P EST LOW 20 MIN: CPT | Performed by: NURSE PRACTITIONER

## 2024-08-01 ASSESSMENT — PAIN DESCRIPTION - PAIN TYPE: TYPE: ACUTE PAIN

## 2024-08-01 ASSESSMENT — PAIN - FUNCTIONAL ASSESSMENT
PAIN_FUNCTIONAL_ASSESSMENT: 0-10
PAIN_FUNCTIONAL_ASSESSMENT: ACTIVITIES ARE NOT PREVENTED

## 2024-08-01 ASSESSMENT — PAIN DESCRIPTION - ORIENTATION: ORIENTATION: LEFT;LOWER

## 2024-08-01 ASSESSMENT — ENCOUNTER SYMPTOMS
FACIAL SWELLING: 0
SORE THROAT: 0

## 2024-08-01 ASSESSMENT — PAIN DESCRIPTION - LOCATION: LOCATION: JAW

## 2024-08-01 ASSESSMENT — PAIN DESCRIPTION - ONSET: ONSET: GRADUAL

## 2024-08-01 ASSESSMENT — PAIN DESCRIPTION - DESCRIPTORS: DESCRIPTORS: STABBING

## 2024-08-01 ASSESSMENT — PAIN SCALES - GENERAL: PAINLEVEL_OUTOF10: 7

## 2024-08-01 ASSESSMENT — PAIN DESCRIPTION - FREQUENCY: FREQUENCY: CONTINUOUS

## 2024-08-01 NOTE — ED PROVIDER NOTES
does not use drugs.    PHYSICAL EXAM     ED TRIAGE VITALS  BP: 128/87, Temp: 98.1 °F (36.7 °C), Pulse: 62, Respirations: 16, SpO2: 96 %,Estimated body mass index is 45.61 kg/m² as calculated from the following:    Height as of this encounter: 1.727 m (5' 8\").    Weight as of this encounter: 136.1 kg (300 lb).,No LMP for male patient.    Physical Exam  Vitals and nursing note reviewed.   Constitutional:       General: He is not in acute distress.  HENT:      Head: Normocephalic.      Right Ear: External ear normal.      Left Ear: External ear normal.      Nose: Nose normal.      Mouth/Throat:      Lips: No lesions.      Mouth: Mucous membranes are moist. No injury.      Dentition: Dental tenderness, gingival swelling and gum lesions (left lower white plaque) present.      Tongue: No lesions. Tongue does not deviate from midline.      Pharynx: No posterior oropharyngeal erythema.   Cardiovascular:      Rate and Rhythm: Normal rate.   Pulmonary:      Effort: Pulmonary effort is normal.   Lymphadenopathy:      Cervical: No cervical adenopathy.   Skin:     General: Skin is warm and dry.   Neurological:      Mental Status: He is alert and oriented to person, place, and time.         DIAGNOSTIC RESULTS     Labs:No results found for this visit on 08/01/24.    IMAGING:  None    EKG:  None    URGENT CARE COURSE:     Vitals:    08/01/24 1314   BP: 128/87   Pulse: 62   Resp: 16   Temp: 98.1 °F (36.7 °C)   TempSrc: Oral   SpO2: 96%   Weight: 136.1 kg (300 lb)   Height: 1.727 m (5' 8\")       Medications - No data to display       PROCEDURES:  None    FINAL IMPRESSION      1. Mouth pain      DISPOSITION/ PLAN   DISPOSITION Decision To Discharge 08/01/2024 01:40:29 PM     Patient with mouth pain possibly secondary to thrush of his lower left gumline.  Will start on Magic mouthwash to help with pain and infection.  Okay for over-the-counter antipyretics as well.  Follow-up with PCP if does not improve.    PATIENT REFERRED  TO:  Brittany Carr MD  [address] / [facility] [address]      DISCHARGE MEDICATIONS:  New Prescriptions    MAGIC MOUTHWASH (MIRACLE MOUTHWASH)    Swish and spit 10 mLs 3 times daily as needed for Pain or Irritation Equal parts Lidocaine viscous 2%, Benadryl, and Nystatin       Discontinued Medications    No medications on file       Current Discharge Medication List          ISABEL Hood CNP    (Please note that portions of this note were completed with a voice recognition program. Efforts were made to edit the dictations but occasionally words are mis-transcribed.)            Jarret Teresa APRN - CNP  08/01/24 6469

## 2025-07-23 ENCOUNTER — HOSPITAL ENCOUNTER (OUTPATIENT)
Dept: GENERAL RADIOLOGY | Age: 45
Discharge: HOME | End: 2025-07-23
Payer: OTHER GOVERNMENT

## 2025-07-23 ENCOUNTER — HOSPITAL ENCOUNTER (OUTPATIENT)
Age: 45
Discharge: HOME | End: 2025-07-23
Payer: OTHER GOVERNMENT

## 2025-07-23 DIAGNOSIS — R52 PAIN: ICD-10-CM

## 2025-07-23 PROCEDURE — 73502 X-RAY EXAM HIP UNI 2-3 VIEWS: CPT

## 2025-07-23 PROCEDURE — 72100 X-RAY EXAM L-S SPINE 2/3 VWS: CPT
